# Patient Record
Sex: MALE | Race: BLACK OR AFRICAN AMERICAN
[De-identification: names, ages, dates, MRNs, and addresses within clinical notes are randomized per-mention and may not be internally consistent; named-entity substitution may affect disease eponyms.]

---

## 2021-10-21 ENCOUNTER — HOSPITAL ENCOUNTER (EMERGENCY)
Dept: HOSPITAL 61 - ER | Age: 31
Discharge: HOME | End: 2021-10-21
Payer: COMMERCIAL

## 2021-10-21 VITALS — BODY MASS INDEX: 29.65 KG/M2 | WEIGHT: 231.04 LBS | HEIGHT: 74 IN

## 2021-10-21 VITALS — SYSTOLIC BLOOD PRESSURE: 130 MMHG | DIASTOLIC BLOOD PRESSURE: 74 MMHG

## 2021-10-21 DIAGNOSIS — M54.89: Primary | ICD-10-CM

## 2021-10-21 DIAGNOSIS — G89.29: ICD-10-CM

## 2021-10-21 PROCEDURE — 99283 EMERGENCY DEPT VISIT LOW MDM: CPT

## 2021-10-21 PROCEDURE — 96372 THER/PROPH/DIAG INJ SC/IM: CPT

## 2021-10-21 NOTE — PHYS DOC
Past Medical History


Additional Past Medical Histor:  back pain


Past Surgical History:  No Surgical History





General Adult


EDM:


Chief Complaint:  BACK PAIN OR INJURY





HPI:


HPI:


31-year-old male with a long history of chronic right-sided back pain presents 

to the emergency department with acute on chronic back pain for the last several

days with gradual onset not associate with any injury or trauma.  He reports his

pain is very similar to his chronic pain and he came to ER today to get pain 

relief.  He received an epidural injection last week for his back pain with his 

back specialist which he has been seeing regularly for his back pain control.  

The patient denies urinary changes, recent weight loss, fever, saddle 

anesthesia, bowel or bladder incontinence, abdominal pain, syncope, weakness or 

numbness, chest pain, shortness of breath, or any other medical complaints. Also

denies IV drug use, history of spinal surgery, history of cancer, or 

immunodeficiency history.





Review of Systems:


Review of Systems:


ROS otherwise negative except for what was mentioned in HPI





Heart Score:


C/O Chest Pain:  No





Allergies:


Allergies:





Allergies








Coded Allergies Type Severity Reaction Last Updated Verified


 


  No Known Drug Allergies    10/21/21 No











Physical Exam:


PE:


Constitutional: No acute distress, non-toxic appearance.


HENT: Atraumatic, bilateral external ears normal, nose normal.


Eyes: PERRLA, EOMI, conjunctiva normal, no discharge.


Neck: Normal range of motion, supple, nontender cervical spine, no stridor.


Cardiovascular: Heart rate regular rhythm. 2+ radial pulses


Skin: Warm, dry, no rash.


Extremities: No tenderness, no cyanosis, ROM intact, no edema.


Back: No point or midline T or L-spine tenderness, no palpable spasm, overlying 

skin is unremarkable, no CVA tenderness


Neurologic: Alert and oriented X 3, 5/5 strength in lower extremities 

bilaterally, L3-S1 dermatomes are intact and equal bilaterally, no focal 

deficits noted. Non ataxic gait. GCS 15.


Psychologic: Affect normal, judgment normal, mood normal.





Current Patient Data:


Vital Signs:





                                   Vital Signs








  Date Time  Temp Pulse Resp B/P (MAP) Pulse Ox O2 Delivery O2 Flow Rate FiO2


 


10/21/21 19:51 98.1 56 18 130/74 (92) 100 Room Air  





 98.1       











Course & Med Decision Making:


Course & Med Decision Making


Patient is driving home, he was given Toradol and a lidocaine patch here in the 

emergency department for back pain control, which is why he stated he came to 

the emergency department today.  His pain today is consistent with his chronic 

pain.  He was advised to follow-up with his neurological specialist as 

indicated.  He had no complaints or red flag symptoms of back pain and his exam 

is relatively benign today.





Departure


Departure


Impression:  


   Primary Impression:  


   Back pain


Disposition:  01 HOME / SELF CARE / HOMELESS


Condition:  STABLE


Patient Instructions:  Back Pain, Adult, Easy-to-Read





Additional Instructions:  


You were seen in the emergency department for back pain.  Your pain could be 

musculoskeletal in nature and could be from a pulled or strained muscle. 


The pain should improve with NSAID medications (ibuprofen, Aleve, etc.), 

stretching, and light activity. You may also use Tylenol for your back pain (no 

more than 3000 mg per day). Take as directed by instructions. Do not take NSAID 

medications if you have kidney disease. Do not take Tylenol if you have liver 

disease.





If it does not improve, you should follow up with a primary care doctor.





- Use stretching and strengthening exercises at least twice per day, continue to

complete physical activity such as walking, and alternate ice and heat (ie 

heating pad) to the area of pain.


- Return to the Emergency Department should your symptoms worsen, or should you 

develop a fever, change in bowel or bladder habits, weakness or numbness in your

lower extremities, inability to walk, or any concern you feel warrants further 

evaluation.


- Take Aleve or Ibuprofen, and Tylenol as needed for your pain. 


- You may use over the counter lidocaine patches as needed for pain.


- Avoid taking narcotic medications for back pain.


- Follow up with your doctor within 1-2 weeks if your symptoms are not 

improving.











PAMELA YO DO             Oct 21, 2021 20:02